# Patient Record
Sex: FEMALE | Race: WHITE | URBAN - NONMETROPOLITAN AREA
[De-identification: names, ages, dates, MRNs, and addresses within clinical notes are randomized per-mention and may not be internally consistent; named-entity substitution may affect disease eponyms.]

---

## 2021-12-09 ENCOUNTER — OFFICE VISIT (OUTPATIENT)
Dept: PRIMARY CARE CLINIC | Age: 61
End: 2021-12-09
Payer: COMMERCIAL

## 2021-12-09 VITALS
BODY MASS INDEX: 35.66 KG/M2 | SYSTOLIC BLOOD PRESSURE: 150 MMHG | WEIGHT: 193.8 LBS | HEIGHT: 62 IN | HEART RATE: 88 BPM | DIASTOLIC BLOOD PRESSURE: 83 MMHG

## 2021-12-09 DIAGNOSIS — F42.9 OBSESSIVE-COMPULSIVE DISORDER, UNSPECIFIED TYPE: ICD-10-CM

## 2021-12-09 DIAGNOSIS — F32.5 DEPRESSION, MAJOR, IN REMISSION (HCC): Primary | ICD-10-CM

## 2021-12-09 PROCEDURE — 99213 OFFICE O/P EST LOW 20 MIN: CPT | Performed by: FAMILY MEDICINE

## 2021-12-09 SDOH — ECONOMIC STABILITY: TRANSPORTATION INSECURITY
IN THE PAST 12 MONTHS, HAS THE LACK OF TRANSPORTATION KEPT YOU FROM MEDICAL APPOINTMENTS OR FROM GETTING MEDICATIONS?: NO

## 2021-12-09 SDOH — ECONOMIC STABILITY: TRANSPORTATION INSECURITY
IN THE PAST 12 MONTHS, HAS LACK OF TRANSPORTATION KEPT YOU FROM MEETINGS, WORK, OR FROM GETTING THINGS NEEDED FOR DAILY LIVING?: NO

## 2021-12-09 SDOH — ECONOMIC STABILITY: FOOD INSECURITY: WITHIN THE PAST 12 MONTHS, THE FOOD YOU BOUGHT JUST DIDN'T LAST AND YOU DIDN'T HAVE MONEY TO GET MORE.: NEVER TRUE

## 2021-12-09 SDOH — ECONOMIC STABILITY: FOOD INSECURITY: WITHIN THE PAST 12 MONTHS, YOU WORRIED THAT YOUR FOOD WOULD RUN OUT BEFORE YOU GOT MONEY TO BUY MORE.: NEVER TRUE

## 2021-12-09 ASSESSMENT — PATIENT HEALTH QUESTIONNAIRE - PHQ9
SUM OF ALL RESPONSES TO PHQ QUESTIONS 1-9: 1
SUM OF ALL RESPONSES TO PHQ QUESTIONS 1-9: 1
2. FEELING DOWN, DEPRESSED OR HOPELESS: 1
SUM OF ALL RESPONSES TO PHQ9 QUESTIONS 1 & 2: 1
SUM OF ALL RESPONSES TO PHQ QUESTIONS 1-9: 1
1. LITTLE INTEREST OR PLEASURE IN DOING THINGS: 0

## 2021-12-09 ASSESSMENT — SOCIAL DETERMINANTS OF HEALTH (SDOH): HOW HARD IS IT FOR YOU TO PAY FOR THE VERY BASICS LIKE FOOD, HOUSING, MEDICAL CARE, AND HEATING?: NOT HARD AT ALL

## 2021-12-09 NOTE — PROGRESS NOTES
Patient is here for f/u meds          RECHECK B/P 168/93      Allergies:  Pcn [penicillins]    Past Medical History:    No past medical history on file. Past Surgical History:    No past surgical history on file. Social History:   Social History     Tobacco Use    Smoking status: Former Smoker     Packs/day: 1.00     Years: 20.00     Pack years: 20.00     Types: Cigarettes     Quit date: 2004     Years since quittin.1    Smokeless tobacco: Never Used   Substance Use Topics    Alcohol use: Not on file       Family History:   Family History   Problem Relation Age of Onset   [facility] Breast Cancer Mother     Hypertension Mother     Diabetes Mother     Breast Cancer Sister          Review of Systems:  Constitutional: negative for fevers or chills  Eyes: negative for visual disturbance   ENT: negative for sore throat or nasal congestion  Respiratory: negative for cough, shortness of breath and sputum  Cardiovascular: negative for chest pain or palpitations  Gastrointestinal: negative for abd pain, nausea, vomiting, diarrhea or constipation  Neurological: negative for unilateral weakness, numbness or tingling. Psych: has a diagnosis of depression and obsessive-compulsive  Sleep-normal                                                                        Appetite-normal  Interest-normal                                                                     Suicidal Ideation-no  Guilt-no                                                                          Psycomotor Activity- normal  Energy-normal  Concentration-normal                                                           Mood -stable    Objective:  BP (!) 150/83 (Site: Right Upper Arm, Position: Sitting) Comment: recheck  Pulse 88   Ht 5' 2\" (1.575 m) Comment: patient states  Wt 193 lb 12.8 oz (87.9 kg)   BMI 35.45 kg/m²  Body mass index is 35.45 kg/m². She is oriented.   HEENT -normal  Cardiovascular: Regular rate and rhythm,no murmer or gallop  Lungs: clear to auscultation bilaterally,no wheezing & no retractions  Extremities:  No edema No calf tenderness  Neuro: Motor power normal.No sensory deficit. Coordination normal.   Psych: Alert and oriented, appropriate affect. NO SUICIDAL OR HOMICIDAL THOUHTS      Assessment:  1. Depression, major, in remission ([facility] [address]    2. Obsessive-compulsive disorder, unspecified type          Plan:      ICD-10-CM    1. Depression, major, in remission ([facility] [address] - well controlled with prozac F32.5    2.  Obsessive-compulsive disorder, unspecified type - well controlled F42.9        Electronically signed by Rip Guerra MD on 12/9/2021 at 2:02 PM

## 2021-12-09 NOTE — PATIENT INSTRUCTIONS
SURVEY:    You may be receiving a survey from [employer] regarding your visit today. You may get this in the mail, through your MyChart, or in your email. Please complete the survey to enable us to provide the highest quality of care to you and your family. If you cannot score us a very good (5 Stars) on any question, please call the office to discuss how we could of made your experience exceptional.    Thank you!     Dr. Shadia Yepez, STACEYN  RYLAN Pacheco, [address] [place] [address], [address]    Phone: [phone]  Fax: [phone]    Office Hours:   Lili Melendez, F: 8-5 Wednesday: 9-11

## 2022-10-26 PROBLEM — Z80.3 FAMILY HISTORY OF BREAST CANCER IN FIRST DEGREE RELATIVE: Status: ACTIVE | Noted: 2022-10-26

## 2022-10-26 PROBLEM — L40.9 PSORIASIS: Status: ACTIVE | Noted: 2022-10-26

## 2022-10-26 PROBLEM — R25.2 LEG CRAMPS: Status: ACTIVE | Noted: 2022-10-26

## 2022-10-26 PROBLEM — F41.9 ANXIETY: Status: ACTIVE | Noted: 2022-10-26

## 2022-10-26 PROBLEM — E66.09 CLASS 2 OBESITY DUE TO EXCESS CALORIES WITHOUT SERIOUS COMORBIDITY WITH BODY MASS INDEX (BMI) OF 36.0 TO 36.9 IN ADULT: Status: ACTIVE | Noted: 2022-10-26

## 2022-10-26 PROBLEM — Z12.31 ENCOUNTER FOR SCREENING MAMMOGRAM FOR MALIGNANT NEOPLASM OF BREAST: Status: ACTIVE | Noted: 2022-10-26

## 2022-10-26 PROBLEM — I10 PRIMARY HYPERTENSION: Status: ACTIVE | Noted: 2022-10-26

## 2022-10-26 PROBLEM — Z00.00 WELL ADULT EXAM: Status: ACTIVE | Noted: 2022-10-26

## 2022-11-25 PROBLEM — Z12.31 ENCOUNTER FOR SCREENING MAMMOGRAM FOR MALIGNANT NEOPLASM OF BREAST: Status: RESOLVED | Noted: 2022-10-26 | Resolved: 2022-11-25

## 2022-11-25 PROBLEM — Z00.00 WELL ADULT EXAM: Status: RESOLVED | Noted: 2022-10-26 | Resolved: 2022-11-25
